# Patient Record
Sex: MALE | Race: BLACK OR AFRICAN AMERICAN | Employment: FULL TIME | URBAN - METROPOLITAN AREA
[De-identification: names, ages, dates, MRNs, and addresses within clinical notes are randomized per-mention and may not be internally consistent; named-entity substitution may affect disease eponyms.]

---

## 2019-10-23 ENCOUNTER — OFFICE VISIT (OUTPATIENT)
Dept: INTERNAL MEDICINE | Facility: CLINIC | Age: 34
End: 2019-10-23
Payer: COMMERCIAL

## 2019-10-23 VITALS
RESPIRATION RATE: 16 BRPM | OXYGEN SATURATION: 99 % | SYSTOLIC BLOOD PRESSURE: 120 MMHG | HEART RATE: 85 BPM | DIASTOLIC BLOOD PRESSURE: 70 MMHG | WEIGHT: 218.2 LBS | TEMPERATURE: 98.1 F

## 2019-10-23 DIAGNOSIS — S29.012A UPPER BACK STRAIN, INITIAL ENCOUNTER: Primary | ICD-10-CM

## 2019-10-23 PROCEDURE — 99203 OFFICE O/P NEW LOW 30 MIN: CPT | Performed by: INTERNAL MEDICINE

## 2019-10-23 SDOH — HEALTH STABILITY: MENTAL HEALTH: HOW OFTEN DO YOU HAVE 6 OR MORE DRINKS ON ONE OCCASION?: LESS THAN MONTHLY

## 2019-10-23 SDOH — HEALTH STABILITY: MENTAL HEALTH: HOW OFTEN DO YOU HAVE A DRINK CONTAINING ALCOHOL?: MONTHLY OR LESS

## 2019-10-23 SDOH — HEALTH STABILITY: MENTAL HEALTH: HOW MANY STANDARD DRINKS CONTAINING ALCOHOL DO YOU HAVE ON A TYPICAL DAY?: 3 OR 4

## 2019-10-23 NOTE — LETTER
2019    To whom it may concern:    I am writing this letter on behalf of my patient, Felice Mitchell (: 1985).  He was seen in my clinic today for an evaluation of an acute back strain.  I have recommended that he be restricted to lifting no more than 25 lbs for the rest of this week.      Please contact my office if you have questions or concerns.    Sincerely,        ROSIBEL Ashby  Department of Internal Medicine  [facility]

## 2019-10-23 NOTE — PATIENT INSTRUCTIONS
- Take ibuprofen, 800 mg three times daily (WITH FOOD), only for next 3-4 days.        Low Back Pain Exercises   Exercises that stretch and strengthen the muscles of your abdomen and spine can help prevent back problems. If your back and abdominal muscles are strong, you can maintain good posture and keep your spine in its correct position.     If your muscles are tight, take a warm shower or bath before doing the exercises. Exercise on a rug or mat. Stop doing any exercise that causes pain until you have talked with your provider.     These exercises are intended only as suggestions. Ask your provider or physical therapist to help you develop an exercise program. Check with your provider before starting the exercises. Ask your provider how many times a week you need to do the exercises.     Low Back Pain Exercises                    Cat and camel: Get down on your hands and knees. Let your stomach sag, allowing your back to curve downward. Hold this position for 5 seconds. Then arch your back and hold for 5 seconds. Do 3 sets of 10.       Pelvic tilt: Lie on your back with your knees bent and your feet flat on the floor. Tighten your abdominal muscles and push your lower back into the floor. Hold this position for 5 seconds, then relax. Do 3 sets of 10.       Extension exercise: Lie face down on the floor for 5 minutes. If this hurts too much, lie face down with a pillow under your stomach. This should relieve your leg or back pain. When you can lie on your stomach for 5 minutes without a pillow, then you can continue with the rest of this exercise.     After lying on your stomach for 5 minutes, prop yourself up on your elbows for another 5 minutes. Lie flat again for 1 minute, then press down on your hands and extend your elbows while keeping your hips flat on the floor. Hold for 1 second and lower yourself to the floor. Repeat 10 times. Do 4 sets. Rest for 2 minutes between sets. You should have no pain in your  legs when you do this, but it is normal to feel pain in your lower back. Do this several times a day.     Developed by [employer]   Published by [employer].   Last modified: 2009-02-08   Last reviewed: 2008-07-07   This content is reviewed periodically and is subject to change as new health information becomes available. The information is intended to inform and educate and is not a replacement for medical evaluation, advice, diagnosis or treatment by a healthcare professional.   Adult Health Advisor 2009.1 Index  Adult Health Advisor 2009.1 Credits     2009 [facility] and/or its affiliates. All Rights Reserved.

## 2019-10-23 NOTE — PROGRESS NOTES
Subjective     Felice Mitchell is a 34 year old male who presents to clinic today for the following health issues:    HPI   Musculoskeletal problem/pain      Duration: Since October 8th    Description  Location: Upper back, and now into neck     Intensity:  7-8/10 (Concerned about job)    Accompanying signs and symptoms: tingling and spasm    History  Previous similar problem: no   Previous evaluation:  none    Precipitating or alleviating factors:  Trauma or overuse: no   Aggravating factors include: moving around, and getting up feels a pull on the back.    Therapies tried and outcome: warm bath with Epson salt        Patient reports a few weeks of intermittent left upper back pain, sometimes extending up into his neck.  No radiation of pain into his arms or legs, pain is worse with certain truncal manipulation.  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      Reviewed and updated as needed this visit by Provider  Tobacco  Allergies  Meds  Problems  Med Hx  Surg Hx  Fam Hx         Review of Systems   ROS COMP: Constitutional, HEENT, cardiovascular, pulmonary, GI, , musculoskeletal, neuro, skin, endocrine and psych systems are negative, except as otherwise noted.      Objective    /70 (BP Location: Left arm, Patient Position: Chair, Cuff Size: Adult Large)   Pulse 85   Temp 98.1  F (36.7  C) (Oral)   Resp 16   Wt 99 kg (218 lb 3.2 oz)   SpO2 99%   There is no height or weight on file to calculate BMI.  Physical Exam   GENERAL: healthy, alert and no distress  RESP: lungs clear to auscultation - no rales, rhonchi or wheezes  CV: regular rate and rhythm, normal S1 S2, no S3 or S4, no murmur, click or rub, no peripheral edema and peripheral pulses strong  ABDOMEN: soft, nontender, no hepatosplenomegaly, no masses and bowel sounds normal  MS: Some palpable tenderness in his upper trapezius musculature bilaterally, and left thoracic paraspinous  musculature.  NEURO: Strength of bilateral upper and lower extremities is +5/5, reflexes normal.  Straight leg raise negative bilaterally            Assessment & Plan     1. Upper back strain, initial encounter  Musculoskeletal back pain.  Discussed conservative therapy including scheduled ibuprofen for next few days, and reviewed a few basic stretching and strengthening exercises.  No need for imaging studies, did write for temporary lifting restrictions for work to last for the rest of this week.       Tobacco Cessation:   reports that he has been smoking cigarettes. He has a 10.50 pack-year smoking history. He has never used smokeless tobacco.  Tobacco Cessation Action Plan: Information offered: Patient not interested at this time        See Patient Instructions    Return in about 4 weeks (around 11/20/2019) for recheck, if symptoms fail to improve.    Castillo Ashby MD  [facility]

## 2021-01-05 ENCOUNTER — HOSPITAL ENCOUNTER (EMERGENCY)
Facility: CLINIC | Age: 36
Discharge: HOME | End: 2021-01-05
Attending: EMERGENCY MEDICINE | Admitting: EMERGENCY MEDICINE
Payer: COMMERCIAL

## 2021-01-05 ENCOUNTER — APPOINTMENT (OUTPATIENT)
Dept: GENERAL RADIOLOGY | Facility: CLINIC | Age: 36
End: 2021-01-05
Attending: EMERGENCY MEDICINE
Payer: COMMERCIAL

## 2021-01-05 VITALS
RESPIRATION RATE: 18 BRPM | OXYGEN SATURATION: 99 % | WEIGHT: 235.45 LBS | SYSTOLIC BLOOD PRESSURE: 161 MMHG | DIASTOLIC BLOOD PRESSURE: 114 MMHG | TEMPERATURE: 97.8 F | HEART RATE: 84 BPM

## 2021-01-05 DIAGNOSIS — R07.9 CHEST PAIN, UNSPECIFIED TYPE: ICD-10-CM

## 2021-01-05 DIAGNOSIS — R06.02 SHORTNESS OF BREATH: ICD-10-CM

## 2021-01-05 LAB
ANION GAP SERPL CALCULATED.3IONS-SCNC: 4 MMOL/L (ref 3–14)
BASOPHILS # BLD AUTO: 0.1 10E9/L (ref 0–0.2)
BASOPHILS NFR BLD AUTO: 1.5 %
BUN SERPL-MCNC: 10 MG/DL (ref 7–30)
CALCIUM SERPL-MCNC: 9.3 MG/DL (ref 8.5–10.1)
CHLORIDE SERPL-SCNC: 104 MMOL/L (ref 94–109)
CO2 SERPL-SCNC: 30 MMOL/L (ref 20–32)
CREAT SERPL-MCNC: 0.97 MG/DL (ref 0.66–1.25)
D DIMER PPP FEU-MCNC: <0.3 UG/ML FEU (ref 0–0.5)
DIFFERENTIAL METHOD BLD: NORMAL
EOSINOPHIL # BLD AUTO: 0.2 10E9/L (ref 0–0.7)
EOSINOPHIL NFR BLD AUTO: 3.7 %
ERYTHROCYTE [DISTWIDTH] IN BLOOD BY AUTOMATED COUNT: 12.5 % (ref 10–15)
GFR SERPL CREATININE-BSD FRML MDRD: >90 ML/MIN/{1.73_M2}
GLUCOSE SERPL-MCNC: 84 MG/DL (ref 70–99)
HCT VFR BLD AUTO: 47.7 % (ref 40–53)
HGB BLD-MCNC: 15.4 G/DL (ref 13.3–17.7)
IMM GRANULOCYTES # BLD: 0 10E9/L (ref 0–0.4)
IMM GRANULOCYTES NFR BLD: 0.2 %
INTERPRETATION ECG - MUSE: NORMAL
LYMPHOCYTES # BLD AUTO: 1.9 10E9/L (ref 0.8–5.3)
LYMPHOCYTES NFR BLD AUTO: 40.6 %
MCH RBC QN AUTO: 28.9 PG (ref 26.5–33)
MCHC RBC AUTO-ENTMCNC: 32.3 G/DL (ref 31.5–36.5)
MCV RBC AUTO: 90 FL (ref 78–100)
MONOCYTES # BLD AUTO: 0.5 10E9/L (ref 0–1.3)
MONOCYTES NFR BLD AUTO: 10.8 %
NEUTROPHILS # BLD AUTO: 2 10E9/L (ref 1.6–8.3)
NEUTROPHILS NFR BLD AUTO: 43.2 %
NRBC # BLD AUTO: 0 10*3/UL
NRBC BLD AUTO-RTO: 0 /100
PLATELET # BLD AUTO: 260 10E9/L (ref 150–450)
POTASSIUM SERPL-SCNC: 3.9 MMOL/L (ref 3.4–5.3)
RBC # BLD AUTO: 5.33 10E12/L (ref 4.4–5.9)
SODIUM SERPL-SCNC: 138 MMOL/L (ref 133–144)
TROPONIN I SERPL-MCNC: <0.015 UG/L (ref 0–0.04)
WBC # BLD AUTO: 4.7 10E9/L (ref 4–11)

## 2021-01-05 PROCEDURE — 85025 COMPLETE CBC W/AUTO DIFF WBC: CPT | Performed by: EMERGENCY MEDICINE

## 2021-01-05 PROCEDURE — 84484 ASSAY OF TROPONIN QUANT: CPT | Performed by: EMERGENCY MEDICINE

## 2021-01-05 PROCEDURE — 71045 X-RAY EXAM CHEST 1 VIEW: CPT

## 2021-01-05 PROCEDURE — 85379 FIBRIN DEGRADATION QUANT: CPT | Performed by: EMERGENCY MEDICINE

## 2021-01-05 PROCEDURE — 93005 ELECTROCARDIOGRAM TRACING: CPT

## 2021-01-05 PROCEDURE — 80048 BASIC METABOLIC PNL TOTAL CA: CPT | Performed by: EMERGENCY MEDICINE

## 2021-01-05 PROCEDURE — 99285 EMERGENCY DEPT VISIT HI MDM: CPT | Mod: 25

## 2021-01-05 ASSESSMENT — ENCOUNTER SYMPTOMS
NAUSEA: 0
ABDOMINAL PAIN: 0
VOMITING: 0
FEVER: 0
COUGH: 0
CHILLS: 0
MYALGIAS: 0
SHORTNESS OF BREATH: 1
PALPITATIONS: 1
CHEST TIGHTNESS: 0

## 2021-01-05 NOTE — ED PROVIDER NOTES
History   Chief Complaint:  Arm Pain, Palpitations, and Shortness of Breath       HPI   Felice Mitchell is a 35 year old male who presents to the ER for evaluation of chest pain and shortness of breath.  Patient states that he has been having a soreness in his back for several weeks and intermittent left hand tingling with pain in the elbow.  Over the last 2 weeks, he has had a soreness to the left chest that does not radiate and is intermittent without any aggravating or relieving factors.  He denies any previous history of exertional chest pain.  He denies any jaw pain, nausea, abnormal sweating with these episodes.  He denies any fever, cough, pain with breathing, leg swelling, recent surgeries or long travel, history of VTE.  He does admit to daily smoking and mother has history of CAD.  Patient has not tried any medications for his pain.  Patient initially thought the soreness in his back and left arm were from tendinitis but given the onset of chest symptoms, presents to the ER for further evaluation.      Review of Systems   Constitutional: Negative for chills and fever.   Respiratory: Positive for shortness of breath. Negative for cough and chest tightness.    Cardiovascular: Positive for chest pain and palpitations. Negative for leg swelling.   Gastrointestinal: Negative for abdominal pain, nausea and vomiting.   Genitourinary: Negative.    Musculoskeletal: Negative for myalgias.   Neurological:        Left hand tingling - intermittent   All other systems reviewed and are negative.      Allergies:  No Known Allergies    Medications:  Patient denies any current medications    Past Medical History:    The patient denies any pertinent past medical history     Social History:  Patient presents to the ED alone.  Current smoker.    Physical Exam     Patient Vitals for the past 24 hrs:   BP Temp Temp src Pulse Resp SpO2 Weight   01/05/21 1545 (!) 161/114 -- -- 84 18 99 % --   01/05/21 1530 (!) 144/112 -- -- 76 19  97 % --   01/05/21 1515 (!) 140/98 -- -- 81 22 98 % --   01/05/21 1500 (!) 145/106 -- -- 85 17 99 % --   01/05/21 1445 (!) 146/96 -- -- 93 21 99 % --   01/05/21 1430 -- -- -- 88 -- 100 % --   01/05/21 1420 (!) 143/94 -- -- 89 -- 100 % --   01/05/21 1405 (!) 142/105 97.8  F (36.6  C) Temporal 105 16 98 % 106.8 kg (235 lb 7.2 oz)       Physical Exam  General: Alert, no acute distress; well appearing  HEENT:  Moist mucous membranes. Conjunctiva normal  CV:  RRR, no m/r/g, skin warm and well perfused  Pulm:  CTAB, no wheezes/ronchi/rales.  No acute distress, breathing comfortably  GI:  Soft, nontender, nondistended.  No rebound or guarding.  Normal bowel sounds  MSK:  Moving all extremities.  No focal areas of edema, erythema, or tenderness  Skin:  WWP, no rashes, no lower extremity edema, skin color normal, no diaphoresis  Psych:  Well-appearing, normal affect, regular speech    Emergency Department Course     ECG:  ECG taken at 1435, ECG read at 1436  Normal sinus rhythm, nonspecific T wave abnormality, abnormal ECG.   Rate 87 bpm. MS interval 148 ms. QRS duration 84 ms. QT/QTc 328/394 ms. P-R-T axes 50 18 45.     Imaging:  XR chest port 1 view:  IMPRESSION: No acute disease.  Report per radiology     Laboratory:  CBC: WBC: 4.7, HGB: 15.4, PLT: 260  BMP:  AWNL (Creatinine: 0.97)  Troponin (1433): <0.015  D-dimer: <0.3    Emergency Department Course:    Reviewed:  nursing notes, vitals, past medical history and care everywhere    Assessments:    1426 Initial assessment     1538 I rechecked the patient and discussed the results of her workup thus far.     Disposition:  The patient was discharged to home.       Impression & Plan     Medical Decision Making:  Felice Mitchell is a 35 year old male presented to the Emergency Department with a complaint of chest pain. Fortunately the workup in the ED has been unremarkable and at this time. The EKG shows sinus rhythm without any acute ST changes concerning for acute ischemia or  infarct.  The troponin is negative despite 2 weeks of intermittent symptoms, and the patient's HEART Score is low. Given these findings, he is low risk for a major cardiac event at 6 weeks.     I also considered other possible causes of chest pain including PE, pneumonia, pneumothorax, aortic dissection, and even more benign causes such as reflux and esophageal motility issues. The physical exam, laboratory, and radiological findings listed above make life threatening conditions less likely.  He is low risk for PE and D-dimer is negative.  Chest x-ray shows no evidence of pneumothorax, pneumonia, mediastinal widening.  At this time I believe the patient is stable for discharge.  In regards to the left hand tingling, this seems to be in the distribution of the ulnar nerve and he has occasional elbow pain session possibility of cubital tunnel syndrome.  There has been no history of trauma and I do not feel that he requires any imaging at this area at this time.  I have encouraged close outpatient PCP follow up if symptoms continue as further outpatient evaluation may be indicated.  Patient is agreeable with this plan.  Anticipatory guidance given prior to discharge.  Discussed the signs and symptoms that should prompt his urgent return to the ER.  All questions were answered prior to his discharge.    Diagnosis:    ICD-10-CM    1. Chest pain, unspecified type  R07.9    2. Shortness of breath  R06.02        Discharge Medications:  There are no discharge medications for this patient.      Scribe Disclosure:  I, Romero Ribeiro, am serving as a scribe at 2:24 PM on 1/5/2021 to document services personally performed by Roman Ahumada MD based on my observations and the provider's statements to me.            Roman Ahumada MD  01/05/21 5428

## 2021-01-05 NOTE — ED AVS SNAPSHOT
[facility]  [address]  [facility] [address]  Phone: [phone]  Fax: [phone]                                    Felice Mitchell   MRN: [id number]    Department: [facility] Emergency Dept   Date of Visit: 1/5/2021           After Visit Summary Signature Page    I have received my discharge instructions, and my questions have been answered. I have discussed any challenges I see with this plan with the nurse or doctor.    ..........................................................................................................................................  Patient/Patient Representative Signature      ..........................................................................................................................................  Patient Representative Print Name and Relationship to Patient    ..................................................               ................................................  Date                                   Time    ..........................................................................................................................................  Reviewed by Signature/Title    ...................................................              ..............................................  Date                                               Time          22EPIC Rev 08/18

## 2021-01-05 NOTE — DISCHARGE INSTRUCTIONS
Discharge Instructions  Chest Pain    You have been seen today for chest pain or discomfort.  At this time, your provider has found no signs that your chest pain is due to a serious or life-threatening condition, (or you have declined more testing and/or admission to the hospital). However, sometimes there is a serious problem that does not show up right away. Your evaluation today may not be complete and you may need further testing and evaluation.     Generally, every Emergency Department visit should have a follow-up clinic visit with either a primary or a specialty clinic/provider. Please follow-up as instructed by your emergency provider today.  Return to the Emergency Department if:  Your chest pain changes, gets worse, starts to happen more often, or comes with less activity.  You are newly short of breath.  You get very weak or tired.  You pass out or faint.  You have any new symptoms, like fever, cough, numb legs, or you cough up blood.  You have anything else that worries you.    Until you follow-up with your regular provider, please do the following:  If a stress test appointment has been made, go to the appointment.  If you have questions, contact your regular provider.  Follow-up with your regular provider/clinic as directed; this is very important.    If you were given a prescription for medicine here today, be sure to read all of the information (including the package insert) that comes with your prescription.  This will include important information about the medicine, its side effects, and any warnings that you need to know about.  The pharmacist who fills the prescription can provide more information and answer questions you may have about the medicine.  If you have questions or concerns that the pharmacist cannot address, please call or return to the Emergency Department.       Remember that you can always come back to the Emergency Department if you are not able to see your regular provider in  the amount of time listed above, if you get any new symptoms, or if there is anything that worries you.

## 2021-01-05 NOTE — ED TRIAGE NOTES
Patient has noticed intermittent left arm pain and tingling for months. Patient noticed that his HR on his FitBit was reading 150. He has also noticed dyspnea on exertion. Notes intermittent chest pain and back pain. ABCDs intact.    Patient does screen positive for depression but denies suicidal thoughts